# Patient Record
Sex: MALE | Employment: FULL TIME | URBAN - METROPOLITAN AREA
[De-identification: names, ages, dates, MRNs, and addresses within clinical notes are randomized per-mention and may not be internally consistent; named-entity substitution may affect disease eponyms.]

---

## 2023-10-20 ENCOUNTER — TRANSFERRED RECORDS (OUTPATIENT)
Dept: HEALTH INFORMATION MANAGEMENT | Facility: CLINIC | Age: 23
End: 2023-10-20

## 2023-10-24 ENCOUNTER — TRANSFERRED RECORDS (OUTPATIENT)
Dept: HEALTH INFORMATION MANAGEMENT | Facility: CLINIC | Age: 23
End: 2023-10-24

## 2023-10-27 ENCOUNTER — TRANSFERRED RECORDS (OUTPATIENT)
Dept: HEALTH INFORMATION MANAGEMENT | Facility: CLINIC | Age: 23
End: 2023-10-27

## 2023-11-14 ENCOUNTER — TRANSFERRED RECORDS (OUTPATIENT)
Dept: HEALTH INFORMATION MANAGEMENT | Facility: CLINIC | Age: 23
End: 2023-11-14

## 2023-12-18 ENCOUNTER — TRANSFERRED RECORDS (OUTPATIENT)
Dept: HEALTH INFORMATION MANAGEMENT | Facility: CLINIC | Age: 23
End: 2023-12-18

## 2024-01-04 ENCOUNTER — MEDICAL CORRESPONDENCE (OUTPATIENT)
Dept: HEALTH INFORMATION MANAGEMENT | Facility: CLINIC | Age: 24
End: 2024-01-04

## 2024-01-05 ENCOUNTER — TRANSCRIBE ORDERS (OUTPATIENT)
Dept: OTHER | Age: 24
End: 2024-01-05

## 2024-01-05 DIAGNOSIS — E22.9 PITUITARY MICROADENOMA WITH HYPERPROLACTINEMIA (H): Primary | ICD-10-CM

## 2024-01-05 DIAGNOSIS — D35.2 PITUITARY MICROADENOMA WITH HYPERPROLACTINEMIA (H): Primary | ICD-10-CM

## 2024-01-09 ENCOUNTER — TELEPHONE (OUTPATIENT)
Dept: NEUROSURGERY | Facility: CLINIC | Age: 24
End: 2024-01-09
Payer: COMMERCIAL

## 2024-01-09 NOTE — TELEPHONE ENCOUNTER
Provider: Sabina Sweeney affiliated with [facility] at [address] [place] [address].    VA: No  ** If yes was is the VA Authorization Number:    Phone number: [phone]    Fax number: unknown     Please be aware that coverage of these services is subject to the terms and limitations of your health insurance plan.  Call member services at your health plan with any benefit or coverage questions.  Please call to schedule your appointment            Order Questions    Question Answer   Preferred Location: [facility] Neurosurgery - [place]   Scheduling Instructions: Please call to schedule your appointment   My Clinical Question Is: Surgical Consult for Pituitary microdenoma with hyperprolactinemia

## 2024-01-09 NOTE — TELEPHONE ENCOUNTER
CE accessed. I called [facility]   [address] [place] [address] [phone] to get records faxed and They are pushing Images

## 2024-01-10 NOTE — TELEPHONE ENCOUNTER
Recds recvd and sent to STAT scanning. Images recvd and loaded into PACS. I called patient and he is having his lab work faxed to me at [phone]

## 2024-01-10 NOTE — TELEPHONE ENCOUNTER
----- Message from Seda Hussein RN sent at 1/8/2024 10:48 AM CST -----  Regarding: RE: Please get imaging  Reina, could we also get lab results? Referral notes elevated prolactin and galactorrhea. Below are the pituitary labs we would want to see, if he has completed any of these.    Prolactin    IGF-1    Growth Hormone    Cortisol    ACTH    T4 Free    TSH    Testosterone Total    FSH    LH    Basic Metabolic Panel      Thanks!    ----- Message -----  From: Audrey Soto  Sent: 1/8/2024   9:54 AM CST  To: Seda Hussein RN; Darla Severin-Brown, LPN  Subject: Please get imaging

## 2024-01-19 NOTE — TELEPHONE ENCOUNTER
Action 1/23/24 MV 7.51am   Action Taken Called [facility] [place] and LVM to push over images     Action 1/24/24 MV 10.41am   Action Taken Request faxed for imaging     Action 1/26/24 MV 7.28am   Action Taken 2nd request faxed      Action 1/29/24 MV 9.17am   Action Taken Called [facility] radiology - they are not able to push images to [place] but can push to [facility] [place]. They will push images to [facility] [place] but writer will need to call [facility] [place] to push the images to [place].    Writer called [facility] [place] to push images - they will get images pushed over shortly.     Action 1/29/24 MV 9.47am   Action Taken Images resolved in PACS     Action Barbra Castle on 1/29/2024 at 1:45 PM    Action Taken Imaging disc from JEAN PAUL Gonzalez received and sent to  for processing. -JA       RECORDS RECEIVED FROM: External    REASON FOR VISIT: Pituitary microadenoma with hyperprolactinemia   Date of Appt: 1/30/24 @ 12:00 pm    NOTES (FOR ALL VISITS) STATUS DETAILS   OFFICE NOTE from referring provider Received 1/5/24 (Transcribed Orders), 12/18/23, 10/27/23, 10/24/23, 10/20/23 (Transferred Recs)  Sabina Sweeney @[facility]     MEDICATION LIST N/A    IMAGING  (FOR ALL VISITS)     MRI (HEAD, NECK, SPINE) PACS [facility]  12/18/23 MR Brain    JEAN PAUL Gonzalez*  11/14/23 MR Brain

## 2024-01-25 NOTE — PROGRESS NOTES
[facility]  Department of Neurosurgery  Center for Skull Base and Pituitary Surgery    January 30, 2024  Video visit    Reason for visit:  pituitary mass, new patient visit    Dear Dr. Sweeney and Ms. Parks,    It was a pleasure to see Mr. Welch in the Center for Skull Base and Pituitary Surgery today. As you recall, Mr. Welch is a pleasant 23 year-old right-handed male who presented to [facility] with months of nipple discharge since October. Episodes are brief and last a few seconds. It occurs mainly on the left side. He underwent a workup that included endocrinology labs that showed a slightly elevated prolactin, so an MRI was ordered showing a small pituitary mass for which he is referred. He reports no thin skin, easy bruising, hair loss, acne, changes in hand/foot size. He has also noticed fatigue and vision changes causing changes to his eye glass prescription. 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 He was able to visit by video with his mother today.    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    Exam by video:  Patient is fluent with speech and very pleasant. Vision could not be assessed by video today. Extraocular movements are intact. Face is symmetric with activation. Apparent symmetric movement in all extremities.    Labs:  TSH 1.48  IGF1 z score 0.2 (normal range -2 to  2)  ACTH 14.8  Na 138  Prolactin 15.1 (slight high)  HbA1c 5.5%    Imaging:  We reviewed the MRI from 12/19/2023 which is uploaded into our system. There is mild asymmetry of the pituitary gland with a possible 2mm hypoenhancing focus on the left side. There is very mild deviation of the pituitary stalk to the left side.    Assessment:  Possible 2mm left pituitary mass  2.   Left sided galactorrhea, ceased recently    Plan:  1. We reviewed the imaging findings and options for management, including surveillance, radiosurgery, and microsurgical resection for pituitary lesions generally. The mass in the pituitary gland is small, and the lab testing appears overall reassuring.  Given the near-normal prolactin level, I cannot confidently link the pituitary lesion to his galactorrhea. For this reason, I recommended observation.  2.  We would like to see him back in 3 months with a repeat MRI and prolactin level. He will see my partner Fatuma SIMPSON or myself to check on the new tests.  3.  Referral to our neuro-endocrinology team  4.  He will see his home optometrist given his blurry vision and family history of IIH  3.  I encouraged Mr. Welch to contact us should any questions or concerns arise.    It has been a pleasure to participate in the care of your patient.  Please feel free to contact me if I may be of any assistance for Mr. Welch.    Visit:  Video started at 12:34pm  Video ended at 12:47pm      25 minutes spent on the date of the encounter doing chart review, review of outside records, review of test results, interpretation of tests, patient visit, documentation and discussion with other provider(s)

## 2024-01-30 ENCOUNTER — VIRTUAL VISIT (OUTPATIENT)
Dept: NEUROSURGERY | Facility: CLINIC | Age: 24
End: 2024-01-30
Payer: COMMERCIAL

## 2024-01-30 ENCOUNTER — PRE VISIT (OUTPATIENT)
Dept: NEUROSURGERY | Facility: CLINIC | Age: 24
End: 2024-01-30

## 2024-01-30 VITALS — WEIGHT: 293 LBS | HEIGHT: 73 IN | BODY MASS INDEX: 38.83 KG/M2

## 2024-01-30 DIAGNOSIS — D35.2 PITUITARY ADENOMA (H): Primary | ICD-10-CM

## 2024-01-30 DIAGNOSIS — D35.2 PITUITARY MICROADENOMA WITH HYPERPROLACTINEMIA (H): ICD-10-CM

## 2024-01-30 DIAGNOSIS — E22.9 PITUITARY MICROADENOMA WITH HYPERPROLACTINEMIA (H): ICD-10-CM

## 2024-01-30 PROCEDURE — 99202 OFFICE O/P NEW SF 15 MIN: CPT | Mod: 95 | Performed by: NEUROLOGICAL SURGERY

## 2024-01-30 ASSESSMENT — PAIN SCALES - GENERAL: PAINLEVEL: NO PAIN (0)

## 2024-01-30 NOTE — LETTER
1/30/2024       RE: Luc Welch  [address] [facility] [address]       Dear Colleague,    Thank you for referring your patient, Luc Welch, to the [facility] NEUROSURGERY CLINIC [place] at [facility]. Please see a copy of my visit note below.    [facility]  Department of Neurosurgery  Center for Skull Base and Pituitary Surgery    January 30, 2024  Video visit    Reason for visit:  pituitary mass, new patient visit    Dear Dr. Sweeney and Ms. Parks,    It was a pleasure to see Mr. Welch in the Center for Skull Base and Pituitary Surgery today. As you recall, Mr. Welch is a pleasant 23 year-old right-handed male who presented to [facility] with months of nipple discharge since October. Episodes are brief and last a few seconds. It occurs mainly on the left side. He underwent a workup that included endocrinology labs that showed a slightly elevated prolactin, so an MRI was ordered showing a small pituitary mass for which he is referred. He reports no thin skin, easy bruising, hair loss, acne, changes in hand/foot size. He has also noticed fatigue and vision changes causing changes to his eye glass prescription. 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 He was able to visit by video with his mother today.    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    Exam by video:  Patient is fluent with speech and very pleasant. Vision could not be assessed by video today. Extraocular movements are intact. Face is symmetric with activation. Apparent symmetric movement in all extremities.    Labs:  TSH 1.48  IGF1 z score 0.2 (normal range -2 to 2)  ACTH 14.8  Na 138  Prolactin 15.1 (slight high)  HbA1c 5.5%    Imaging:  We reviewed the MRI from 12/19/2023 which is uploaded into our system. There is mild asymmetry of the pituitary gland with a possible 2mm hypoenhancing focus on the left side. There is very mild deviation of the pituitary stalk to the left side.    Assessment:  Possible 2mm left pituitary mass  2.   Left sided galactorrhea, ceased recently    Plan:  1. We reviewed the imaging findings and options for management, including surveillance, radiosurgery, and microsurgical resection for pituitary lesions generally. The mass in the pituitary gland is small, and the lab testing appears overall reassuring.  Given the near-normal prolactin level, I cannot confidently link the pituitary lesion to his galactorrhea. For this reason, I recommended observation.  2.  We would like to see him back in 3 months with a repeat MRI and prolactin level. He will see my partner Fatuma SIMPSON or myself to check on the new tests.  3.  Referral to our neuro-endocrinology team  4.  He will see his home optometrist given his blurry vision and family history of IIH  3.  I encouraged Mr. Welch to contact us should any questions or concerns arise.    It has been a pleasure to participate in the care of your patient.  Please feel free to contact me if I may be of any assistance for Mr. Welch.      25 minutes spent on the date of the encounter doing chart review, review of outside records, review of test results, interpretation of tests, patient visit, documentation and discussion with other provider(s)        Again, thank you for allowing me to participate  in the care of your patient.      Sincerely,    Prudencio Mchugh MD

## 2024-01-30 NOTE — PROGRESS NOTES
Virtual Visit Details    Type of service:  Video Visit     Originating Location (pt. Location): Home    Distant Location (provider location):  On-site  Platform used for Video Visit: Yue

## 2024-01-30 NOTE — NURSING NOTE
Is the patient currently in the state of [place]? NO    Visit mode:VIDEO    If the visit is dropped, the patient can be reconnected by: VIDEO VISIT: Text to cell phone: [phone]      Will anyone else be joining the visit? NO  (If patient encounters technical issues they should call [phone])    How would you like to obtain your AVS? Mail a copy    Are changes needed to the allergy or medication list? Yes Unable to reconcile all allergies and medications from outside sources. Pt is also allergic to  nuts and rye.    Pt is taking a multivitamin and and vitamin D.    Reason for visit: Consult    Bernice MENONF

## 2024-08-21 NOTE — CONFIDENTIAL NOTE
RECORDS RECEIVED FROM: internal    DATE RECEIVED: 9.4.24    NOTES (FOR ALL VISITS) STATUS DETAILS   OFFICE NOTES from referring provider internal    Prudencio Mchugh MD      OFFICE NOTES from other specialist Received  [facility]- 12.18.23, 10.27.23   MEDICATION LIST internal     IMAGING      MRI (BRAIN) [facility]- 12.19.24   Received 12.18.23, 11.14.23    LABS     DIABETES: HBGA1C, CREATININE, FASTING LIPIDS, MICROALBUMIN URINE, POTASSIUM, TSH, T4    THYROID: TSH, T4, CBC, THYRODLONULIN, TOTAL T3, FREE T4, CALCITONIN, CEA internal  Received - 10.24.23  Creatinine- 12.18.23

## 2024-09-04 ENCOUNTER — VIRTUAL VISIT (OUTPATIENT)
Dept: ENDOCRINOLOGY | Facility: CLINIC | Age: 24
End: 2024-09-04
Attending: NEUROLOGICAL SURGERY
Payer: COMMERCIAL

## 2024-09-04 ENCOUNTER — PRE VISIT (OUTPATIENT)
Dept: ENDOCRINOLOGY | Facility: CLINIC | Age: 24
End: 2024-09-04

## 2024-09-04 VITALS — BODY MASS INDEX: 37.6 KG/M2 | WEIGHT: 285 LBS

## 2024-09-04 DIAGNOSIS — N64.3 GALACTORRHEA: ICD-10-CM

## 2024-09-04 DIAGNOSIS — D35.2 PITUITARY ADENOMA (H): Primary | ICD-10-CM

## 2024-09-04 PROCEDURE — 99244 OFF/OP CNSLTJ NEW/EST MOD 40: CPT | Mod: 95 | Performed by: INTERNAL MEDICINE

## 2024-09-04 NOTE — NURSING NOTE
Current [place] location:  [place]    Is the patient currently in the state of [place]? YES    Visit mode:VIDEO    If the visit is dropped, the patient can be reconnected by: VIDEO VISIT: Text to cell phone:   No relevant phone numbers on file.       Will anyone else be joining the visit? NO  (If patient encounters technical issues they should call [phone])    How would you like to obtain your AVS? MyChart    Are changes needed to the allergy or medication list? Yes: Also taking over the counter antacids and multi vitamin    Are refills needed on medications prescribed by this physician? NO    Rooming Documentation:  Questionnaire(s) completed      Reason for visit: Video Visit and RECHECK    Purnima RIDDLE

## 2024-09-04 NOTE — PROGRESS NOTES
Video visit  Start 2:00 pm  End 2:38 pm  [facility]                                                                               - Endocrinology Initial Consultation -    Reason for visit/consult:  Pituitary adenoma (H)  [unfilled]     Primary care provider: Ping Parks    HPI: 24 year old male is here for endocrinology clinic visit for Pituitary adenoma (H).   Since 10/2023, noticed breast discharge 3 times from the left nipple, and one time from the right nipple. His PRL level at that time was 15.1 (with upperlimit of 13). He underwent to brain MRI, and read as possible micro lesion of 2 mm. He also underwent to chest ultrasound, which was normal.   He admitted that HA for a few years, deep behind the eyes, frequency once a day. He was still functioning daily, no nausea, no dizziness.   Currently his HA is becoming better. Second measurement of PRL was 6/2024 7.8 (2-13) in his local clinic.     Occasional numbness his lower arms bilateral. No gaining muscle mass despite hard working at [employer].   Testosterone was also borderline, he was told.     Energy level: low and tired. Sleep: 5-6 hours. Appetite: good. BW: 285 lb, Ht 6'1.   He has been on low carb diet recently and having weight loss 10 lb for 1 month.     Eretciel dysfunction: no, no loss of libido.     He shaves only every 2-3 weeks.       Past Medical/Surgical History:  No past medical history on file.  No past surgical history on file.    Allergies:  Allergies   Allergen Reactions    Bee Venom Anaphylaxis    Cats Angioedema and Shortness Of Breath     Puffy eyes    Citrullus Vulgaris Rash and Shortness Of Breath    Latex Rash and Shortness Of Breath    Promethazine Anaphylaxis    Rice Other (See Comments)     Upset GI       Current Medications   Current Outpatient Medications   Medication Sig Dispense Refill    VITAMIN D PO Take 1 capsule by mouth.       No current facility-administered medications for this visit.       Family History:  No family history  "on file.    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     ROS:  Full review of systems taken with the help of the intake sheet. Otherwise a complete 14 point review of systems was taken and is negative unless stated in the history above.      Physical Exam:   Vitals: Wt 129.3 kg (285 lb)   BMI 37.60 kg/m    BMI= Body mass index is 37.6 kg/m .   General: well appearing, no acute distress, pleasant and conversant,   Mental Status/neuro: alert and oriented  Face: symmetrical, normal facial color  Eyes: anicteric, no proptosis or lid lag  Resp: normally breathing      Labs : I reviewed data from epic and extract and summarize the pertinent data here.   10/24/3 local clinic    PRL 15.1   IGF1 Z 0.2, ACTH 14.8, TSH 1.48, total testostertone 193   Na 139, K 3.8, Cr 0.91  No results found for: \"NA\"   No results found for: \"POTASSIUM\"  No results found for: \"CHLORIDE\"  No results found for: \"NEVILLE\"  No results found for: \"CO2\"  No results found for: \"BUN\"  No results found for: \"CR\"  No results found for: \"GLC\"  No results found for: \"TSH\"  No results found for: \"T4\"  No results found for: \"A1C\"    No results found for: \"IGF1\"  No results found for: \"LH\"  No results found for: \"FSH\"  No results found for: \"ESTROGEN\"  No results found for: \"PROLACTIN\"        MRI Brain: I personally reviewed the original images and agree with the below reports.   Patient Name:   AMADOR CARNEY    YOB: 2000    Procedure: MRI PITUITARY WITHOUT AND WITH CONTRAST    Date of Service: 05/21/2024       EXAM: MRI PITUITARY WITHOUT AND WITH CONTRAST     INDICATION: ICD-10 D35.2 Hypercortisolism due to pituitary adenoma   ICD-10 E24.0 Hypercortisolism due to pituitary adenoma   ICD-10 D35.2 Pituitary microadenoma with hyperprolactinemia   ICD-10 " E22.9 Pituitary microadenoma with hyperprolactinemia     TECHNIQUE: Multisequence, multiplanar MRI of the brain was performed prior to and following administration of intravenous contrast, utilizing pituitary protocol.     COMPARISON(S): MRI dated December 18, 2023.     FINDING:   Diffusion weighted imaging is unremarkable. No acute territorial infarct or acute intracranial hemorrhage. No midline shift or mass effect. No herniation or hydrocephalus. Pineal region and craniocervical junction appear normal. Cerebellopontine angles appear intact. Orbits and globes appear symmetric and within normal limits. Retention cysts versus polyps in the right maxillary sinus with mild mucosal thickening in the ethmoid air cells. Mastoid air cells are clear. No abnormal intracranial enhancement seen postcontrast administration.     Evaluation of the pituitary gland demonstrates a 2 mm hypoenhancing focus in the left lobe concerning for a pituitary microadenoma. The pituitary infundibulum is slightly deviated towards the left. The sellar floor appears intact. Suprasellar space appears within normal limits.     IMPRESSION:   2 mm hypoenhancing focus in the left lobe of the pituitary gland concerning for a microadenoma.           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 Mchugh.           Return to clinic with me in 6 month .     minutes spent on the date of the encounter doing chart review, history and exam, personal review of imaging, outside record, documentation and further activities as noted above.    Note: Chart documentation done in part with Dragon Voice Recognition software. Although reviewed  after completion, some word and grammatical errors may remain.  Please consider this when interpreting information in this chart     Rebecca Jensen MD  Staff Physician  Endocrinology and Metabolism  License: [id number]

## 2024-09-04 NOTE — LETTER
9/4/2024       RE: Luc Welch  [address] [facility] [address]     Dear Colleague,    Thank you for referring your patient, Luc Welch, to the [facility] ENDOCRINOLOGY CLINIC [place] at [facility]. Please see a copy of my visit note below.    Video visit  Start 2:00 pm  End 2:38 pm  Amwell                                                                               - Endocrinology Initial Consultation -    Reason for visit/consult:  Pituitary adenoma (H)  [unfilled]     Primary care provider: Ping Parks    HPI: 24 year old male is here for endocrinology clinic visit for Pituitary adenoma (H).   Since 10/2023, noticed breast discharge 3 times from the left nipple, and one time from the right nipple. His PRL level at that time was 15.1 (with upperlimit of 13). He underwent to brain MRI, and read as possible micro lesion of 2 mm. He also underwent to chest ultrasound, which was normal.   He admitted that HA for a few years, deep behind the eyes, frequency once a day. He was still functioning daily, no nausea, no dizziness.   Currently his HA is becoming better. Second measurement of PRL was 6/2024 7.8 (2-13) in his local clinic.     Occasional numbness his lower arms bilateral. No gaining muscle mass despite hard working at [employer].   Testosterone was also borderline, he was told.     Energy level: low and tired. Sleep: 5-6 hours. Appetite: good. BW: 285 lb, Ht 6'1.   He has been on low carb diet recently and having weight loss 10 lb for 1 month.     Eretciel dysfunction: no, no loss of libido.     He shaves only every 2-3 weeks.       Past Medical/Surgical History:  No past medical history on file.  No past surgical history on file.    Allergies:  Allergies   Allergen Reactions     Bee Venom Anaphylaxis     Cats Angioedema and Shortness Of Breath     Puffy eyes     Citrullus Vulgaris Rash and Shortness Of Breath     Latex Rash and  "Shortness Of Breath     Promethazine Anaphylaxis     Rice Other (See Comments)     Upset GI       Current Medications   Current Outpatient Medications   Medication Sig Dispense Refill     VITAMIN D PO Take 1 capsule by mouth.       No current facility-administered medications for this visit.       Family History:  No family history on file.    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     ROS:  Full review of systems taken with the help of the intake sheet. Otherwise a complete 14 point review of systems was taken and is negative unless stated in the history above.      Physical Exam:   Vitals: Wt 129.3 kg (285 lb)   BMI 37.60 kg/m    BMI= Body mass index is 37.6 kg/m .   General: well appearing, no acute distress, pleasant and conversant,   Mental Status/neuro: alert and oriented  Face: symmetrical, normal facial color  Eyes: anicteric, no proptosis or lid lag  Resp: normally breathing      Labs : I reviewed data from epic and extract and summarize the pertinent data here.   No results found for: \"NA\"   No results found for: \"POTASSIUM\"  No results found for: \"CHLORIDE\"  No results found for: \"NEVILLE\"  No results found for: \"CO2\"  No results found for: \"BUN\"  No results found for: \"CR\"  No results found for: \"GLC\"  No results found for: \"TSH\"  No results found for: \"T4\"  No results found for: \"A1C\"    No results found for: \"IGF1\"  No results found for: \"LH\"  No results found for: \"FSH\"  No results found for: \"ESTROGEN\"  No results found for: \"PROLACTIN\"        MRI Brain: I personally reviewed the original images and agree with the below reports.   Patient Name:   AMADOR CARNEY    YOB: 2000    Procedure: MRI PITUITARY WITHOUT AND WITH CONTRAST    Date of Service: 05/21/2024       EXAM: MRI PITUITARY WITHOUT " AND WITH CONTRAST     INDICATION: ICD-10 D35.2 Hypercortisolism due to pituitary adenoma   ICD-10 E24.0 Hypercortisolism due to pituitary adenoma   ICD-10 D35.2 Pituitary microadenoma with hyperprolactinemia   ICD-10 E22.9 Pituitary microadenoma with hyperprolactinemia     TECHNIQUE: Multisequence, multiplanar MRI of the brain was performed prior to and following administration of intravenous contrast, utilizing pituitary protocol.     COMPARISON(S): MRI dated December 18, 2023.     FINDING:   Diffusion weighted imaging is unremarkable. No acute territorial infarct or acute intracranial hemorrhage. No midline shift or mass effect. No herniation or hydrocephalus. Pineal region and craniocervical junction appear normal. Cerebellopontine angles appear intact. Orbits and globes appear symmetric and within normal limits. Retention cysts versus polyps in the right maxillary sinus with mild mucosal thickening in the ethmoid air cells. Mastoid air cells are clear. No abnormal intracranial enhancement seen postcontrast administration.     Evaluation of the pituitary gland demonstrates a 2 mm hypoenhancing focus in the left lobe concerning for a pituitary microadenoma. The pituitary infundibulum is slightly deviated towards the left. The sellar floor appears intact. Suprasellar space appears within normal limits.     IMPRESSION:   2 mm hypoenhancing focus in the left lobe of the pituitary gland concerning for a microadenoma.           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 Mchugh.           Return to clinic with me in 6 month .     minutes spent on the date of the encounter doing chart review,  history and exam, personal review of imaging, outside record, documentation and further activities as noted above.    Note: Chart documentation done in part with Dragon Voice Recognition software. Although reviewed after completion, some word and grammatical errors may remain.  Please consider this when interpreting information in this chart     Rebecca Jensen MD  Staff Physician  Endocrinology and Metabolism  License: [id number]       Again, thank you for allowing me to participate in the care of your patient.      Sincerely,    Rebecca Jensen MD

## 2024-09-05 ENCOUNTER — TELEPHONE (OUTPATIENT)
Dept: ENDOCRINOLOGY | Facility: CLINIC | Age: 24
End: 2024-09-05
Payer: COMMERCIAL

## 2024-09-05 NOTE — TELEPHONE ENCOUNTER
----- Message from Rebecca Jensen sent at 9/4/2024  3:04 PM CDT -----  Regarding: lab work  Could you please send   IGF1  Total testosterone  LH  FSH  TSH  Free T4   To his local clinic?    [facility] fax [phone]    Thank you

## 2024-09-05 NOTE — TELEPHONE ENCOUNTER
Please fax lab orders  to [facility] Fax [phone]  by Dr Mikey Colbert RN on 9/5/2024 at 8:49 AM

## 2024-09-20 ENCOUNTER — TELEPHONE (OUTPATIENT)
Dept: ENDOCRINOLOGY | Facility: CLINIC | Age: 24
End: 2024-09-20
Payer: COMMERCIAL

## 2024-09-20 ENCOUNTER — TELEPHONE (OUTPATIENT)
Dept: NEUROSURGERY | Facility: CLINIC | Age: 24
End: 2024-09-20
Payer: COMMERCIAL

## 2024-09-20 NOTE — TELEPHONE ENCOUNTER
Told patient that we have not received the lab results from [facility] yet and gave him our fax number.  Cat Pena

## 2024-09-20 NOTE — TELEPHONE ENCOUNTER
[facility] Call Center    Phone Message    May a detailed message be left on voicemail: yes     Reason for Call: Patient states the [facility] in [place] Faxed over the results to the clinic 9/18/24 and 2 weeks ago before this date. Please call to update patient to let him know that you received the results. Thank you.     Action Taken: Message routed to:  [facility] ENDO    Travel Screening: Not Applicable     Date of Service:

## 2024-10-06 ENCOUNTER — HEALTH MAINTENANCE LETTER (OUTPATIENT)
Age: 24
End: 2024-10-06

## 2024-10-18 ENCOUNTER — TELEPHONE (OUTPATIENT)
Dept: ENDOCRINOLOGY | Facility: CLINIC | Age: 24
End: 2024-10-18
Payer: COMMERCIAL

## 2024-10-18 NOTE — TELEPHONE ENCOUNTER
Rebecca Jensen MD  You16 hours ago (3:51 PM)       Outside record reviewed and called the patient.  PRL is normal 10, testosterone is 178 low,. We will repeat one more time lab,  Please send lab requisition to his clinic local in [place]?    Prolactin, total testotsrone    Thank you

## 2024-10-23 ENCOUNTER — MYC MEDICAL ADVICE (OUTPATIENT)
Dept: ENDOCRINOLOGY | Facility: CLINIC | Age: 24
End: 2024-10-23
Payer: COMMERCIAL

## 2024-12-20 ENCOUNTER — TRANSFERRED RECORDS (OUTPATIENT)
Dept: HEALTH INFORMATION MANAGEMENT | Facility: CLINIC | Age: 24
End: 2024-12-20
Payer: COMMERCIAL

## 2025-01-03 ENCOUNTER — MEDICAL CORRESPONDENCE (OUTPATIENT)
Dept: HEALTH INFORMATION MANAGEMENT | Facility: CLINIC | Age: 25
End: 2025-01-03
Payer: COMMERCIAL

## 2025-01-03 ENCOUNTER — TRANSFERRED RECORDS (OUTPATIENT)
Dept: HEALTH INFORMATION MANAGEMENT | Facility: CLINIC | Age: 25
End: 2025-01-03
Payer: COMMERCIAL

## 2025-02-18 ENCOUNTER — TRANSFERRED RECORDS (OUTPATIENT)
Dept: HEALTH INFORMATION MANAGEMENT | Facility: CLINIC | Age: 25
End: 2025-02-18
Payer: COMMERCIAL

## 2025-03-12 ENCOUNTER — OFFICE VISIT (OUTPATIENT)
Dept: ENDOCRINOLOGY | Facility: CLINIC | Age: 25
End: 2025-03-12
Payer: COMMERCIAL

## 2025-03-12 VITALS
WEIGHT: 305 LBS | BODY MASS INDEX: 40.42 KG/M2 | HEIGHT: 73 IN | DIASTOLIC BLOOD PRESSURE: 84 MMHG | HEART RATE: 80 BPM | SYSTOLIC BLOOD PRESSURE: 129 MMHG | OXYGEN SATURATION: 96 %

## 2025-03-12 DIAGNOSIS — D35.2 BENIGN NEOPLASM OF PITUITARY GLAND (H): Primary | ICD-10-CM

## 2025-03-12 DIAGNOSIS — N64.3 GALACTORRHEA: ICD-10-CM

## 2025-03-12 DIAGNOSIS — E29.1 HYPOGONADISM MALE: ICD-10-CM

## 2025-03-12 RX ORDER — TESTOSTERONE CYPIONATE 200 MG/ML
200 INJECTION, SOLUTION INTRAMUSCULAR
Qty: 4 ML | Refills: 5 | Status: SHIPPED | OUTPATIENT
Start: 2025-03-12

## 2025-03-12 RX ORDER — CABERGOLINE 0.5 MG/1
0.25 TABLET ORAL WEEKLY
Qty: 6 TABLET | Refills: 3 | Status: SHIPPED | OUTPATIENT
Start: 2025-03-12

## 2025-03-12 ASSESSMENT — PAIN SCALES - GENERAL: PAINLEVEL_OUTOF10: MILD PAIN (2)

## 2025-03-12 NOTE — NURSING NOTE
"Chief Complaint   Patient presents with    Pituitary Problem     Vital signs:      BP: (!) 140/86 Pulse: 80     SpO2: 96 %     Height: 185.4 cm (6' 1\") Weight: (!) 138.3 kg (305 lb)  Estimated body mass index is 40.24 kg/m  as calculated from the following:    Height as of this encounter: 1.854 m (6' 1\").    Weight as of this encounter: 138.3 kg (305 lb).      Vital signs:      BP: 129/84 Pulse: 80     SpO2: 96 %     Height: 185.4 cm (6' 1\") Weight: (!) 138.3 kg (305 lb)  Estimated body mass index is 40.24 kg/m  as calculated from the following:    Height as of this encounter: 1.854 m (6' 1\").    Weight as of this encounter: 138.3 kg (305 lb).        "

## 2025-03-12 NOTE — LETTER
3/12/2025       RE: Luc Welch  [address] [facility] [address]     Dear Colleague,    Thank you for referring your patient, Luc Welch, to the [facility] ENDOCRINOLOGY CLINIC [place] at [facility]. Please see a copy of my visit note below.                                                                                 - Endocrinology Return Visit -    Reason for visit/consult:  Pituitary adenoma (H)  [unfilled]     Primary care provider: Ping Parks      Assessment and Plan  Mr. Luc Welch is a 25 year old male with galactorrhea, mild - upperlimit PRL, low testosterone, and a small lesion in the pituitary.     #Low testosterone  His most recent total testosterone was 233 taken 2/4/25 at his local clinic in [place].   - We discussed medical options for increasing testosterone. Will try intramuscular injections.     - Will start on testosterone 200 mg injections every 2 weeks.     # Male galactorrhea with normal PRL  - He will start cabergoline 0.25 mg once a week today (although normla PRL, one time slight high, and galactorrhea persisting) .     Local clinic: [facility] (fac [phone])    Tamika Hammer MS3    Attending tie-in note   I saw the patient with JOLEEN Mcclendon and directly examined patient and discussed. Agree above note and plan.    Return to clinic with me in 6 month .       Note: Chart documentation done in part with Dragon Voice Recognition software. Although reviewed after completion, some word and grammatical errors may remain.  Please consider this when interpreting information in this chart     The longitudinal plan of care for the diagnosis(es)/condition(s) as documented were addressed during this visit. Due to the added complexity in care, I will continue to support Luc in the subsequent management and with ongoing continuity of care.     Rebecca Jensen MD  Staff Physician  Endocrinology and  Metabolism  License: [id number]       Interval History 3/12/25: Breast discharge has decreased in frequency to every couple of weeks. More recently, he notices nipple sensation without much discharge. Experiencing dull headaches 2 times per week. A recent HA was associated with an episode of eft cheek numbness.  Endorses low energy and struggle building muscle. Long history of unmedicated anxiety.   HPI: 24 year old male is here for endocrinology clinic visit for Pituitary adenoma (H).   Since 10/2023, noticed breast discharge 3 times from the left nipple, and one time from the right nipple. His PRL level at that time was 15.1 (with upperlimit of 13). He underwent to brain MRI, and read as possible micro lesion of 2 mm. He also underwent to chest ultrasound, which was normal.   He admitted that HA for a few years, deep behind the eyes, frequency once a day. He was still functioning daily, no nausea, no dizziness.   Currently his HA is becoming better. Second measurement of PRL was 6/2024 7.8 (2-13) in his local clinic.     Occasional numbness his lower arms bilateral. No gaining muscle mass despite hard working at [employer].   Testosterone was also borderline, he was told.     Energy level: low and tired. Sleep: 5-6 hours. Appetite: good. BW: 285 lb, Ht 6'1.   He has been on low carb diet recently and having weight loss 10 lb for 1 month.     Eretciel dysfunction: no, no loss of libido.     He shaves only every 2-3 weeks.     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 It is a 10 hour drive to get to clinic for him.     ROS:  Full review of systems taken with the help of  "the intake sheet. Otherwise a complete 14 point review of systems was taken and is negative unless stated in the history above.      Physical Exam:   Vitals: /84   Pulse 80   Ht 1.854 m (6' 1\")   Wt (!) 138.3 kg (305 lb)   SpO2 96%   BMI 40.24 kg/m    BMI= Body mass index is 40.24 kg/m .   General: well appearing, no acute distress, pleasant and conversant,   Mental Status/neuro: alert and oriented  Face: symmetrical, normal facial color  Eyes: anicteric, no proptosis or lid lag  Resp: normally breathing    Labs taken 2/4/25 at [facility]:   - Testosterone total: 233  - PRL: 8.1  - LH: 2.1   - FSH: 1.7  - TSH: 2.4  - Hemoglobin A1C: 5.4       MRI Brain: I personally reviewed the original images and agree with the below reports.   Patient Name:   AMADOR CARNEY    YOB: 2000    Procedure: MRI PITUITARY WITHOUT AND WITH CONTRAST    Date of Service: 05/21/2024       EXAM: MRI PITUITARY WITHOUT AND WITH CONTRAST     INDICATION: ICD-10 D35.2 Hypercortisolism due to pituitary adenoma   ICD-10 E24.0 Hypercortisolism due to pituitary adenoma   ICD-10 D35.2 Pituitary microadenoma with hyperprolactinemia   ICD-10 E22.9 Pituitary microadenoma with hyperprolactinemia     TECHNIQUE: Multisequence, multiplanar MRI of the brain was performed prior to and following administration of intravenous contrast, utilizing pituitary protocol.     COMPARISON(S): MRI dated December 18, 2023.     FINDING:   Diffusion weighted imaging is unremarkable. No acute territorial infarct or acute intracranial hemorrhage. No midline shift or mass effect. No herniation or hydrocephalus. Pineal region and craniocervical junction appear normal. Cerebellopontine angles appear intact. Orbits and globes appear symmetric and within normal limits. Retention cysts versus polyps in the right maxillary sinus with mild mucosal thickening in the ethmoid air cells. Mastoid air cells are clear. No abnormal intracranial " enhancement seen postcontrast administration.     Evaluation of the pituitary gland demonstrates a 2 mm hypoenhancing focus in the left lobe concerning for a pituitary microadenoma. The pituitary infundibulum is slightly deviated towards the left. The sellar floor appears intact. Suprasellar space appears within normal limits.     IMPRESSION:   2 mm hypoenhancing focus in the left lobe of the pituitary gland concerning for a microadenoma.           Again, thank you for allowing me to participate in the care of your patient.      Sincerely,    Rebecca Jensen MD

## 2025-03-12 NOTE — PROGRESS NOTES
- Endocrinology Return Visit -    Reason for visit/consult:  Pituitary adenoma (H)  [unfilled]     Primary care provider: Ping Parks      Assessment and Plan  Mr. Luc Welch is a 25 year old male with galactorrhea, mild - upperlimit PRL, low testosterone, and a small lesion in the pituitary.     #Low testosterone  His most recent total testosterone was 233 taken 2/4/25 at his local clinic in [place].   - We discussed medical options for increasing testosterone. Will try intramuscular injections.     - Will start on testosterone 200 mg injections every 2 weeks.     # Male galactorrhea with normal PRL  - He will start cabergoline 0.25 mg once a week today (although normla PRL, one time slight high, and galactorrhea persisting) .     Local clinic: [facility] (fac [phone])    JOLEEN Mcclendon    Attending tie-in note   I saw the patient with JOLEEN Mcclendon and directly examined patient and discussed. Agree above note and plan.    Return to clinic with me in 6 month .       Note: Chart documentation done in part with Dragon Voice Recognition software. Although reviewed after completion, some word and grammatical errors may remain.  Please consider this when interpreting information in this chart     The longitudinal plan of care for the diagnosis(es)/condition(s) as documented were addressed during this visit. Due to the added complexity in care, I will continue to support Luc in the subsequent management and with ongoing continuity of care.     Rebecca Jensen MD  Staff Physician  Endocrinology and Metabolism  License: [id number]       Interval History 3/12/25: Breast discharge has decreased in frequency to every couple of weeks. More recently, he notices nipple sensation without much discharge. Experiencing dull headaches 2 times per week. A recent HA was associated with an episode of eft cheek numbness.  Endorses low  "energy and struggle building muscle. Long history of unmedicated anxiety.   HPI: 24 year old male is here for endocrinology clinic visit for Pituitary adenoma (H).   Since 10/2023, noticed breast discharge 3 times from the left nipple, and one time from the right nipple. His PRL level at that time was 15.1 (with upperlimit of 13). He underwent to brain MRI, and read as possible micro lesion of 2 mm. He also underwent to chest ultrasound, which was normal.   He admitted that HA for a few years, deep behind the eyes, frequency once a day. He was still functioning daily, no nausea, no dizziness.   Currently his HA is becoming better. Second measurement of PRL was 6/2024 7.8 (2-13) in his local clinic.     Occasional numbness his lower arms bilateral. No gaining muscle mass despite hard working at [employer].   Testosterone was also borderline, he was told.     Energy level: low and tired. Sleep: 5-6 hours. Appetite: good. BW: 285 lb, Ht 6'1.   He has been on low carb diet recently and having weight loss 10 lb for 1 month.     Eretciel dysfunction: no, no loss of libido.     He shaves only every 2-3 weeks.     Allergies:  - Bee venom, anaphylaxis  - Cats, angioedema & shortness of breath  - Citrullus Vulgaris, rash & shortness of breath  - Latex, rash & shortness of breath  - Promethazine, anaphylaxis   - Rice, upset GI     Current Medications   - Vitamin D  - Multivitamin     Social History:  - Works at a [employer], it is physical work  - Lives [place] on the [place] of [place]. It is a 10 hour drive to get to clinic for him.     ROS:  Full review of systems taken with the help of the intake sheet. Otherwise a complete 14 point review of systems was taken and is negative unless stated in the history above.      Physical Exam:   Vitals: /84   Pulse 80   Ht 1.854 m (6' 1\")   Wt (!) 138.3 kg (305 lb)   SpO2 96%   BMI 40.24 kg/m    BMI= Body mass index is 40.24 kg/m .   General: well appearing, " no acute distress, pleasant and conversant,   Mental Status/neuro: alert and oriented  Face: symmetrical, normal facial color  Eyes: anicteric, no proptosis or lid lag  Resp: normally breathing    Labs taken 2/4/25 [facility]:   - Testosterone total: 233  - PRL: 8.1  - LH: 2.1   - FSH: 1.7  - TSH: 2.4  - Hemoglobin A1C: 5.4       MRI Brain: I personally reviewed the original images and agree with the below reports.   Patient Name:   AMADOR CARNEY    YOB: 2000    Procedure: MRI PITUITARY WITHOUT AND WITH CONTRAST    Date of Service: 05/21/2024       EXAM: MRI PITUITARY WITHOUT AND WITH CONTRAST     INDICATION: ICD-10 D35.2 Hypercortisolism due to pituitary adenoma   ICD-10 E24.0 Hypercortisolism due to pituitary adenoma   ICD-10 D35.2 Pituitary microadenoma with hyperprolactinemia   ICD-10 E22.9 Pituitary microadenoma with hyperprolactinemia     TECHNIQUE: Multisequence, multiplanar MRI of the brain was performed prior to and following administration of intravenous contrast, utilizing pituitary protocol.     COMPARISON(S): MRI dated December 18, 2023.     FINDING:   Diffusion weighted imaging is unremarkable. No acute territorial infarct or acute intracranial hemorrhage. No midline shift or mass effect. No herniation or hydrocephalus. Pineal region and craniocervical junction appear normal. Cerebellopontine angles appear intact. Orbits and globes appear symmetric and within normal limits. Retention cysts versus polyps in the right maxillary sinus with mild mucosal thickening in the ethmoid air cells. Mastoid air cells are clear. No abnormal intracranial enhancement seen postcontrast administration.     Evaluation of the pituitary gland demonstrates a 2 mm hypoenhancing focus in the left lobe concerning for a pituitary microadenoma. The pituitary infundibulum is slightly deviated towards the left. The sellar floor appears intact. Suprasellar space appears within normal limits.      IMPRESSION:   2 mm hypoenhancing focus in the left lobe of the pituitary gland concerning for a microadenoma.

## 2025-03-17 ENCOUNTER — TRANSFERRED RECORDS (OUTPATIENT)
Dept: HEALTH INFORMATION MANAGEMENT | Facility: CLINIC | Age: 25
End: 2025-03-17
Payer: COMMERCIAL

## 2025-03-21 ENCOUNTER — MEDICAL CORRESPONDENCE (OUTPATIENT)
Dept: HEALTH INFORMATION MANAGEMENT | Facility: CLINIC | Age: 25
End: 2025-03-21
Payer: COMMERCIAL

## 2025-04-15 ENCOUNTER — MYC REFILL (OUTPATIENT)
Dept: ENDOCRINOLOGY | Facility: CLINIC | Age: 25
End: 2025-04-15
Payer: COMMERCIAL

## 2025-04-15 DIAGNOSIS — D35.2 BENIGN NEOPLASM OF PITUITARY GLAND (H): ICD-10-CM

## 2025-04-15 DIAGNOSIS — E34.9 TESTOSTERONE DEFICIENCY: ICD-10-CM

## 2025-04-15 DIAGNOSIS — E29.1 HYPOGONADISM IN MALE: Primary | ICD-10-CM

## 2025-04-15 NOTE — TELEPHONE ENCOUNTER
[facility] Dawsxn48/15/2025 08:41 AM   Protocol Details Lipid panel on file in past 12 mos    ALT on file within past 12 mos    HCT less than 54% on file within past 12 mos    Refills for this classification require provider review    AST on file within past 12 mos

## 2025-04-17 RX ORDER — TESTOSTERONE CYPIONATE 200 MG/ML
200 INJECTION, SOLUTION INTRAMUSCULAR
Qty: 4 ML | Refills: 5 | Status: SHIPPED | OUTPATIENT
Start: 2025-04-17

## 2025-05-29 ENCOUNTER — TRANSFERRED RECORDS (OUTPATIENT)
Dept: HEALTH INFORMATION MANAGEMENT | Facility: CLINIC | Age: 25
End: 2025-05-29
Payer: COMMERCIAL

## 2025-05-29 LAB
ALT SERPL-CCNC: 42 U/L (ref 7–43)
AST SERPL-CCNC: 24 U/L (ref 13–39)

## 2025-06-03 NOTE — PROGRESS NOTES
[facility]  Department of Neurosurgery  Center for Skull Base and Pituitary Surgery    Name: Luc Welch  MRN: [id number]  Age: 25 year old  : [birth year]25     Chief Complaint:   Pituitary adenoma, follow up visit    History of Present Illness:   Luc Welch is a 25 year old right handed male who presented to [facility] with concerns of nipple discharge starting 2023; workup included prolactin level which was slightly elevated, leading to an MRI which revealed a small pituitary mass. He initially met with Dr. Mchugh 2024 and options for management were reviewed. He's been following with Dr. Jensen in Endocrinology and is taking cabergoline weekly along with testosterone injections. He feels he tolerates these medications well. He had recent lab work completed 2025 near home, prolactin 5.4, testosterone 423.10.    Review of Systems:   Pertinent items are noted in HPI or as in patient entered ROS below, remainder of complete ROS is negative.     Physical Exam:   /75 (BP Location: Right arm, Patient Position: Sitting)   Pulse 70   Resp 16   Wt (!) 142.7 kg (314 lb 8 oz)   SpO2 97%   BMI 41.49 kg/m    General: No acute distress.    Eyes: Conjunctivae are normal.  MSK: Moves all extremities.  No obvious deformity.  Neuro: The patient is fully oriented. Speech is normal. Facial nerve function is normal, rated as a House Brackmann 1. Gait is normal.   Psych: Normal mood and affect. Behavior is normal.      Imaging:  We reviewed the MRI completed today and compared this to previous. There is persistent asymmetry of the pituitary gland and perhaps subtle hypoenhancement in the left sella, not as convincing as previous MRI. No new lesions or growth seen. Radiology read is pending.     Assessment:  Pituitary microadenoma, follow up visit    Plan:  We reviewed the imaging findings today, certainly reassuring in terms of any growth. Reviewed  with Dr. Mchugh as well. Will plan to see Mr. Welch back in 1 year with repeat pituitary MRI prior, and he was encouraged to reach out sooner as needed. He'll continue to follow up with Dr. Jensen regarding medications.       Fatuma Tinajero PA-C  Department of Neurosurgery

## 2025-06-04 ENCOUNTER — OFFICE VISIT (OUTPATIENT)
Dept: NEUROSURGERY | Facility: CLINIC | Age: 25
End: 2025-06-04
Payer: COMMERCIAL

## 2025-06-04 ENCOUNTER — ANCILLARY PROCEDURE (OUTPATIENT)
Dept: MRI IMAGING | Facility: CLINIC | Age: 25
End: 2025-06-04
Attending: NEUROLOGICAL SURGERY
Payer: COMMERCIAL

## 2025-06-04 ENCOUNTER — VIRTUAL VISIT (OUTPATIENT)
Dept: ENDOCRINOLOGY | Facility: CLINIC | Age: 25
End: 2025-06-04
Payer: COMMERCIAL

## 2025-06-04 VITALS
RESPIRATION RATE: 16 BRPM | WEIGHT: 314.5 LBS | OXYGEN SATURATION: 97 % | BODY MASS INDEX: 41.49 KG/M2 | HEART RATE: 70 BPM | DIASTOLIC BLOOD PRESSURE: 75 MMHG | SYSTOLIC BLOOD PRESSURE: 115 MMHG

## 2025-06-04 DIAGNOSIS — N64.3 GALACTORRHEA: ICD-10-CM

## 2025-06-04 DIAGNOSIS — D35.2 PROLACTINOMA (H): Primary | ICD-10-CM

## 2025-06-04 DIAGNOSIS — D35.2 BENIGN NEOPLASM OF PITUITARY GLAND (H): ICD-10-CM

## 2025-06-04 DIAGNOSIS — D35.2 PITUITARY ADENOMA (H): ICD-10-CM

## 2025-06-04 PROCEDURE — 1126F AMNT PAIN NOTED NONE PRSNT: CPT | Mod: 95 | Performed by: INTERNAL MEDICINE

## 2025-06-04 PROCEDURE — G2211 COMPLEX E/M VISIT ADD ON: HCPCS | Performed by: INTERNAL MEDICINE

## 2025-06-04 PROCEDURE — 98006 SYNCH AUDIO-VIDEO EST MOD 30: CPT | Performed by: INTERNAL MEDICINE

## 2025-06-04 RX ORDER — GADOBUTROL 604.72 MG/ML
15 INJECTION INTRAVENOUS ONCE
Status: COMPLETED | OUTPATIENT
Start: 2025-06-04 | End: 2025-06-04

## 2025-06-04 RX ORDER — TESTOSTERONE ENANTHATE 200 MG/ML
INJECTION, SOLUTION INTRAMUSCULAR
COMMUNITY

## 2025-06-04 RX ADMIN — GADOBUTROL 14 ML: 604.72 INJECTION INTRAVENOUS at 08:28

## 2025-06-04 NOTE — PROGRESS NOTES
- Endocrinology Return Visit -    Reason for visit/consult:  Pituitary adenoma (H)     Primary care provider: Ping Parks      Assessment and Plan  Mr. Luc Welch is a 25 year old male with galactorrhea, mild - upperlimit PRL, low testosterone, and a small lesion in the pituitary.     #Low testosterone  His most recent total testosterone was 233 taken 2/4/25 at his local clinic in [place].   - We discussed medical options for increasing testosterone. Will try intramuscular injections.     - continue testosterone 200 mg injections every 2 weeks.     # Male galactorrhea with normal PRL  - conitnue cabergoline 0.25 mg once a week  (PRL after starting 5.4) , stopped galactorrhea.     Local clinic: [facility] (fax [phone])    # Elevated Hb level 17.5  Initially concerned due to testosterone treatment, but seems baseline high 17.1 prior to start, I suggested to go over this with his PMD.     # Weight   lb, he is interested in trying GLP1      Return to clinic with me in 6 month .       30  minutes spent on the date of the encounter doing chart review, history and exam, documentation and further activities as noted above.      The longitudinal plan of care for the diagnosis(es)/condition(s) as documented were addressed during this visit. Due to the added complexity in care, I will continue to support Luc in the subsequent management and with ongoing continuity of care.     Rebecca Jensen MD  Staff Physician  Endocrinology and Metabolism  License: [id number]     Interval History as of 6/4/2025 : Patient has been doing feeling better, stopped galactorrhea, started to have some acnes. . Medication compliance good.   Interval History 3/12/25: Breast discharge has decreased in frequency to every couple of weeks. More recently, he notices nipple sensation without much discharge. Experiencing dull headaches 2 times per week.  A recent HA was associated with an episode of eft cheek numbness.  Endorses low energy and struggle building muscle. Long history of unmedicated anxiety.   HPI: 24 year old male is here for endocrinology clinic visit for Pituitary adenoma (H).   Since 10/2023, noticed breast discharge 3 times from the left nipple, and one time from the right nipple. His PRL level at that time was 15.1 (with upperlimit of 13). He underwent to brain MRI, and read as possible micro lesion of 2 mm. He also underwent to chest ultrasound, which was normal.   He admitted that HA for a few years, deep behind the eyes, frequency once a day. He was still functioning daily, no nausea, no dizziness.   Currently his HA is becoming better. Second measurement of PRL was 6/2024 7.8 (2-13) in his local clinic.     Occasional numbness his lower arms bilateral. No gaining muscle mass despite hard working at [employer].   Testosterone was also borderline, he was told.     Energy level: low and tired. Sleep: 5-6 hours. Appetite: good. BW: 285 lb, Ht 6'1.   He has been on low carb diet recently and having weight loss 10 lb for 1 month.     Eretciel dysfunction: no, no loss of libido.     He shaves only every 2-3 weeks.     Allergies:  - Bee venom, anaphylaxis  - Cats, angioedema & shortness of breath  - Citrullus Vulgaris, rash & shortness of breath  - Latex, rash & shortness of breath  - Promethazine, anaphylaxis   - Rice, upset GI     Current Medications   - Vitamin D  - Multivitamin     Social History:  - Works at a [employer], it is physical work  - Lives with his parents [place] the [place] [facility]. It is a 10 hour drive to get to clinic for him.     ROS:  Full review of systems taken with the help of the intake sheet. Otherwise a complete 14 point review of systems was taken and is negative unless stated in the history above.      Physical Exam:   Vitals: There were no vitals taken for this visit.  BMI= There is no height or weight on file to  calculate BMI.   General: well appearing, no acute distress, pleasant and conversant,   Mental Status/neuro: alert and oriented  Face: symmetrical, normal facial color  Eyes: anicteric, no proptosis or lid lag  Resp: normally breathing    Labs taken 2/4/25 at [facility]:   - Testosterone total: 233  - PRL: 8.1  - LH: 2.1   - FSH: 1.7  - TSH: 2.4  - Hemoglobin A1C: 5.4       MRI Brain: I personally reviewed the original images and agree with the below reports.   Patient Name:   AMADOR CARNEY    YOB: 2000    Procedure: MRI PITUITARY WITHOUT AND WITH CONTRAST    Date of Service: 05/21/2024       EXAM: MRI PITUITARY WITHOUT AND WITH CONTRAST     INDICATION: ICD-10 D35.2 Hypercortisolism due to pituitary adenoma   ICD-10 E24.0 Hypercortisolism due to pituitary adenoma   ICD-10 D35.2 Pituitary microadenoma with hyperprolactinemia   ICD-10 E22.9 Pituitary microadenoma with hyperprolactinemia     TECHNIQUE: Multisequence, multiplanar MRI of the brain was performed prior to and following administration of intravenous contrast, utilizing pituitary protocol.     COMPARISON(S): MRI dated December 18, 2023.     FINDING:   Diffusion weighted imaging is unremarkable. No acute territorial infarct or acute intracranial hemorrhage. No midline shift or mass effect. No herniation or hydrocephalus. Pineal region and craniocervical junction appear normal. Cerebellopontine angles appear intact. Orbits and globes appear symmetric and within normal limits. Retention cysts versus polyps in the right maxillary sinus with mild mucosal thickening in the ethmoid air cells. Mastoid air cells are clear. No abnormal intracranial enhancement seen postcontrast administration.     Evaluation of the pituitary gland demonstrates a 2 mm hypoenhancing focus in the left lobe concerning for a pituitary microadenoma. The pituitary infundibulum is slightly deviated towards the left. The sellar floor appears intact.  Suprasellar space appears within normal limits.     IMPRESSION:   2 mm hypoenhancing focus in the left lobe of the pituitary gland concerning for a microadenoma.

## 2025-06-04 NOTE — NURSING NOTE
Current patient location: [place]    Is the patient currently in the state of [place]? YES    Visit mode: VIDEO    If the visit is dropped, the patient can be reconnected by:VIDEO VISIT: Text to cell phone:   Telephone Information:   Mobile [phone]       Will anyone else be joining the visit? NO  (If patient encounters technical issues they should call [phone]    Are changes needed to the allergy or medication list? No and Pt stated no med changes    Are refills needed on medications prescribed by this physician? NO    Rooming Documentation:  Not applicable    Reason for visit: LETY RIDDLE

## 2025-06-04 NOTE — PATIENT INSTRUCTIONS
Follow up in 1 year, with pituitary MRI prior to appointment.  Please reach out sooner with new concerns.

## 2025-06-04 NOTE — DISCHARGE INSTRUCTIONS
MRI Contrast Discharge Instructions    The IV contrast you received today will pass out of your body in your  urine. This will happen in the next 24 hours. You will not feel this process.  Your urine will not change color.    Drink at least 4 extra glasses of water or juice today (unless your doctor  has restricted your fluids). This reduces the stress on your kidneys.  You may take your regular medicines.    If you are on dialysis: It is best to have dialysis today.    If you have a reaction: Most reactions happen right away. If you have  any new symptoms after leaving the hospital (such as hives or swelling),  call your hospital at the correct number below. Or call your family doctor.  If you have breathing distress or wheezing, call 911.    Special instructions: ***    I have read and understand the above information.    Signature:______________________________________ Date:___________    Staff:__________________________________________ Date:___________     Time:__________    [place] [facility] Departments:    ___[place]: [phone]  ___[facility]: [phone]  ___[place]: [phone] ___[facility]: [phone]  ___[facility]: [phone]  ___[facility]: [phone]  ___[phone]  ___[facility]: [phone]  ___[phone]

## 2025-06-04 NOTE — LETTER
6/4/2025       RE: Luc Welch  [address] [facility] [address]     Dear Colleague,    Thank you for referring your patient, Luc Welch, to the [facility] ENDOCRINOLOGY CLINIC [place] at [facility]. Please see a copy of my visit note below.                                                                                 - Endocrinology Return Visit -    Reason for visit/consult:  Pituitary adenoma (H)     Primary care provider: Ping Parks      Assessment and Plan  Mr. Luc Welch is a 25 year old male with galactorrhea, mild - upperlimit PRL, low testosterone, and a small lesion in the pituitary.     #Low testosterone  His most recent total testosterone was 233 taken 2/4/25 at his local clinic in [place].   - We discussed medical options for increasing testosterone. Will try intramuscular injections.     - continue testosterone 200 mg injections every 2 weeks.     # Male galactorrhea with normal PRL  - conitnue cabergoline 0.25 mg once a week  (PRL after starting 5.4) , stopped galactorrhea.     Local clinic: [facility] (fax [phone])    # Elevated Hb level 17.5  Initially concerned due to testosterone treatment, but seems baseline high 17.1 prior to start, I suggested to go over this with his PMD.     # Weight   lb, he is interested in trying GLP1      Return to clinic with me in 6 month .       30  minutes spent on the date of the encounter doing chart review, history and exam, documentation and further activities as noted above.      The longitudinal plan of care for the diagnosis(es)/condition(s) as documented were addressed during this visit. Due to the added complexity in care, I will continue to support Luc in the subsequent management and with ongoing continuity of care.     Rebecca Jensen MD  Staff Physician  Endocrinology and Metabolism  License: [id number]     Interval History as of 6/4/2025 : Patient has  been doing feeling better, stopped galactorrhea, started to have some acnes. . Medication compliance good.   Interval History 3/12/25: Breast discharge has decreased in frequency to every couple of weeks. More recently, he notices nipple sensation without much discharge. Experiencing dull headaches 2 times per week. A recent HA was associated with an episode of eft cheek numbness.  Endorses low energy and struggle building muscle. Long history of unmedicated anxiety.   HPI: 24 year old male is here for endocrinology clinic visit for Pituitary adenoma (H).   Since 10/2023, noticed breast discharge 3 times from the left nipple, and one time from the right nipple. His PRL level at that time was 15.1 (with upperlimit of 13). He underwent to brain MRI, and read as possible micro lesion of 2 mm. He also underwent to chest ultrasound, which was normal.   He admitted that HA for a few years, deep behind the eyes, frequency once a day. He was still functioning daily, no nausea, no dizziness.   Currently his HA is becoming better. Second measurement of PRL was 6/2024 7.8 (2-13) in his local clinic.     Occasional numbness his lower arms bilateral. No gaining muscle mass despite hard working at [employer].   Testosterone was also borderline, he was told.     Energy level: low and tired. Sleep: 5-6 hours. Appetite: good. BW: 285 lb, Ht 6'1.   He has been on low carb diet recently and having weight loss 10 lb for 1 month.     Eretciel dysfunction: no, no loss of libido.     He shaves only every 2-3 weeks.     Allergies:  - Bee venom, anaphylaxis  - Cats, angioedema & shortness of breath  - Citrullus Vulgaris, rash & shortness of breath  - Latex, rash & shortness of breath  - Promethazine, anaphylaxis   - Rice, upset GI     Current Medications   - Vitamin D  - Multivitamin     Social History:  - Works at a [employer], it is physical work  - [place]. It is a 10 hour drive to get to  clinic for him.     ROS:  Full review of systems taken with the help of the intake sheet. Otherwise a complete 14 point review of systems was taken and is negative unless stated in the history above.      Physical Exam:   Vitals: There were no vitals taken for this visit.  BMI= There is no height or weight on file to calculate BMI.   General: well appearing, no acute distress, pleasant and conversant,   Mental Status/neuro: alert and oriented  Face: symmetrical, normal facial color  Eyes: anicteric, no proptosis or lid lag  Resp: normally breathing    Labs taken 2/4/25 at [facility]:   - Testosterone total: 233  - PRL: 8.1  - LH: 2.1   - FSH: 1.7  - TSH: 2.4  - Hemoglobin A1C: 5.4       MRI Brain: I personally reviewed the original images and agree with the below reports.   Patient Name:   AMADOR CARNEY    YOB: 2000    Procedure: MRI PITUITARY WITHOUT AND WITH CONTRAST    Date of Service: 05/21/2024       EXAM: MRI PITUITARY WITHOUT AND WITH CONTRAST     INDICATION: ICD-10 D35.2 Hypercortisolism due to pituitary adenoma   ICD-10 E24.0 Hypercortisolism due to pituitary adenoma   ICD-10 D35.2 Pituitary microadenoma with hyperprolactinemia   ICD-10 E22.9 Pituitary microadenoma with hyperprolactinemia     TECHNIQUE: Multisequence, multiplanar MRI of the brain was performed prior to and following administration of intravenous contrast, utilizing pituitary protocol.     COMPARISON(S): MRI dated December 18, 2023.     FINDING:   Diffusion weighted imaging is unremarkable. No acute territorial infarct or acute intracranial hemorrhage. No midline shift or mass effect. No herniation or hydrocephalus. Pineal region and craniocervical junction appear normal. Cerebellopontine angles appear intact. Orbits and globes appear symmetric and within normal limits. Retention cysts versus polyps in the right maxillary sinus with mild mucosal thickening in the ethmoid air cells. Mastoid air cells are  clear. No abnormal intracranial enhancement seen postcontrast administration.     Evaluation of the pituitary gland demonstrates a 2 mm hypoenhancing focus in the left lobe concerning for a pituitary microadenoma. The pituitary infundibulum is slightly deviated towards the left. The sellar floor appears intact. Suprasellar space appears within normal limits.     IMPRESSION:   2 mm hypoenhancing focus in the left lobe of the pituitary gland concerning for a microadenoma.         Again, thank you for allowing me to participate in the care of your patient.      Sincerely,    Rebecca Jensen MD

## 2025-06-04 NOTE — LETTER
2025       RE: Luc Welch  [address] [facility] [address]     Dear Colleague,    Thank you for referring your patient, Luc Welch, to the [facility] NEUROSURGERY CLINIC [place] at [facility]. Please see a copy of my visit note below.      [facility]  Department of Neurosurgery  Center for Skull Base and Pituitary Surgery    Name: Luc Welch  MRN: [id number]  Age: 25 year old  : 2025     Chief Complaint:   Pituitary adenoma, follow up visit    History of Present Illness:   Luc Welch is a 25 year old right handed male who presented to [facility] with concerns of nipple discharge starting 2023; workup included prolactin level which was slightly elevated, leading to an MRI which revealed a small pituitary mass. He initially met with Dr. Mchugh 2024 and options for management were reviewed. He's been following with Dr. Jensen in Endocrinology and is taking cabergoline weekly along with testosterone injections. He feels he tolerates these medications well. He had recent lab work completed 2025 near home, prolactin 5.4, testosterone 423.10.    Review of Systems:   Pertinent items are noted in HPI or as in patient entered ROS below, remainder of complete ROS is negative.     Physical Exam:   /75 (BP Location: Right arm, Patient Position: Sitting)   Pulse 70   Resp 16   Wt (!) 142.7 kg (314 lb 8 oz)   SpO2 97%   BMI 41.49 kg/m    General: No acute distress.    Eyes: Conjunctivae are normal.  MSK: Moves all extremities.  No obvious deformity.  Neuro: The patient is fully oriented. Speech is normal. Facial nerve function is normal, rated as a House Brackmann 1. Gait is normal.   Psych: Normal mood and affect. Behavior is normal.      Imaging:  We reviewed the MRI completed today and compared this to previous. There is persistent asymmetry of the  pituitary gland and perhaps subtle hypoenhancement in the left sella, not as convincing as previous MRI. No new lesions or growth seen. Radiology read is pending.     Assessment:  Pituitary microadenoma, follow up visit    Plan:  We reviewed the imaging findings today, certainly reassuring in terms of any growth. Reviewed with Dr. Mchugh as well. Will plan to see Mr. Welch back in 1 year with repeat pituitary MRI prior, and he was encouraged to reach out sooner as needed. He'll continue to follow up with Dr. Jensen regarding medications.       Fatuma Tinajero PA-C  Department of Neurosurgery      Again, thank you for allowing me to participate in the care of your patient.      Sincerely,    Fatuma Tinajero PA-C